# Patient Record
Sex: FEMALE | Race: OTHER | URBAN - METROPOLITAN AREA
[De-identification: names, ages, dates, MRNs, and addresses within clinical notes are randomized per-mention and may not be internally consistent; named-entity substitution may affect disease eponyms.]

---

## 2022-10-10 ENCOUNTER — OFFICE VISIT (OUTPATIENT)
Dept: OBGYN CLINIC | Facility: CLINIC | Age: 47
End: 2022-10-10
Payer: COMMERCIAL

## 2022-10-10 VITALS — SYSTOLIC BLOOD PRESSURE: 135 MMHG | DIASTOLIC BLOOD PRESSURE: 84 MMHG | WEIGHT: 184 LBS

## 2022-10-10 DIAGNOSIS — N92.1 MENOMETRORRHAGIA: Primary | ICD-10-CM

## 2022-10-10 DIAGNOSIS — D50.0 IRON DEFICIENCY ANEMIA DUE TO CHRONIC BLOOD LOSS: ICD-10-CM

## 2022-10-10 DIAGNOSIS — N93.9 ABNORMAL UTERINE BLEEDING (AUB): ICD-10-CM

## 2022-10-10 RX ORDER — LEVONORGESTREL AND ETHINYL ESTRADIOL 0.1-0.02MG
1 KIT ORAL DAILY
Qty: 84 TABLET | Refills: 0 | Status: SHIPPED | OUTPATIENT
Start: 2022-10-10

## 2022-10-10 RX ORDER — LIDOCAINE HYDROCHLORIDE 20 MG/ML
1 SOLUTION ORAL; TOPICAL 2 TIMES DAILY
COMMUNITY
Start: 2022-06-25

## 2022-10-10 RX ORDER — TRANEXAMIC ACID 650 1/1
1300 TABLET ORAL 3 TIMES DAILY
COMMUNITY
Start: 2022-10-08

## 2022-12-21 ENCOUNTER — TELEPHONE (OUTPATIENT)
Dept: OBGYN CLINIC | Facility: CLINIC | Age: 47
End: 2022-12-21

## 2022-12-21 NOTE — TELEPHONE ENCOUNTER
Rep from Dr Ze Alcala office (oncology) calling to request most recent progress notes.  Please fax to [phone]

## 2023-01-12 ENCOUNTER — LAB ENCOUNTER (OUTPATIENT)
Dept: LAB | Age: 48
End: 2023-01-12
Attending: OBSTETRICS & GYNECOLOGY
Payer: COMMERCIAL

## 2023-01-12 ENCOUNTER — TELEPHONE (OUTPATIENT)
Dept: OBGYN CLINIC | Facility: CLINIC | Age: 48
End: 2023-01-12

## 2023-01-12 ENCOUNTER — OFFICE VISIT (OUTPATIENT)
Dept: OBGYN CLINIC | Facility: CLINIC | Age: 48
End: 2023-01-12

## 2023-01-12 VITALS — DIASTOLIC BLOOD PRESSURE: 87 MMHG | WEIGHT: 191 LBS | SYSTOLIC BLOOD PRESSURE: 146 MMHG

## 2023-01-12 DIAGNOSIS — N92.1 MENOMETRORRHAGIA: ICD-10-CM

## 2023-01-12 DIAGNOSIS — N92.6 IRREGULAR MENSES: ICD-10-CM

## 2023-01-12 DIAGNOSIS — D50.9 IRON DEFICIENCY ANEMIA, UNSPECIFIED IRON DEFICIENCY ANEMIA TYPE: Primary | ICD-10-CM

## 2023-01-12 DIAGNOSIS — N93.9 ABNORMAL UTERINE BLEEDING (AUB): ICD-10-CM

## 2023-01-12 LAB — FSH SERPL-ACNC: 11.3 MIU/ML

## 2023-01-12 PROCEDURE — 3077F SYST BP >= 140 MM HG: CPT | Performed by: OBSTETRICS & GYNECOLOGY

## 2023-01-12 PROCEDURE — 3079F DIAST BP 80-89 MM HG: CPT | Performed by: OBSTETRICS & GYNECOLOGY

## 2023-01-12 PROCEDURE — 83001 ASSAY OF GONADOTROPIN (FSH): CPT

## 2023-01-12 PROCEDURE — 36415 COLL VENOUS BLD VENIPUNCTURE: CPT

## 2023-01-12 PROCEDURE — 99214 OFFICE O/P EST MOD 30 MIN: CPT | Performed by: OBSTETRICS & GYNECOLOGY

## 2023-01-12 RX ORDER — NORETHINDRONE ACETATE AND ETHINYL ESTRADIOL 1; .02 MG/1; MG/1
1 TABLET ORAL DAILY
Qty: 84 TABLET | Refills: 0 | Status: SHIPPED | OUTPATIENT
Start: 2023-01-12

## 2023-01-12 NOTE — TELEPHONE ENCOUNTER
I am having trouble finding certain records in [facility] everywhere\".   Please see if you can obtain the office records and operative report from gynecologist Dr. Dary Rodgers who did a D&C on the patient when she had menometrorrhagia and was hospitalized with severe anemia in August.

## 2023-01-13 NOTE — TELEPHONE ENCOUNTER
Unable to see records in care everywhere. Called office of Dr. Jamil Choi at [phone]. [facility] for a return call to obtain records.

## 2023-01-17 NOTE — TELEPHONE ENCOUNTER
Called office of Dr. Nelida Chandler. Spoke to clinical staff and placed on hold for 13 minutes prior to call getting disconnected. Called office again. No answer, LMTCB.

## 2023-01-19 NOTE — TELEPHONE ENCOUNTER
I received a call from Dr. Malena Frias office requesting LISE to be faxed to them at [phone]. Called and spoke to pt. Pt will go to [facility] today to sign LISE. Clinical staff at [facility] informed.      Routing to [employer] as [facility]

## 2023-02-03 ENCOUNTER — MED REC SCAN ONLY (OUTPATIENT)
Dept: OBGYN CLINIC | Facility: CLINIC | Age: 48
End: 2023-02-03

## 2023-02-13 ENCOUNTER — TELEPHONE (OUTPATIENT)
Dept: OBGYN CLINIC | Facility: CLINIC | Age: 48
End: 2023-02-13

## 2023-02-13 NOTE — TELEPHONE ENCOUNTER
Pt bleeding has stopped. There is an order for transvag ultrasound. Pt asking if this gets scheduled with [facility] or Central Scheduling.     Pls advise

## 2023-02-13 NOTE — TELEPHONE ENCOUNTER
Patient name and  verified. Patient informed pelvic US to be completed with radiology. Patient transferred to central scheduling.

## 2023-02-15 ENCOUNTER — HOSPITAL ENCOUNTER (OUTPATIENT)
Dept: ULTRASOUND IMAGING | Age: 48
Discharge: HOME | End: 2023-02-15
Attending: OBSTETRICS & GYNECOLOGY
Payer: COMMERCIAL

## 2023-02-15 DIAGNOSIS — D50.9 IRON DEFICIENCY ANEMIA, UNSPECIFIED IRON DEFICIENCY ANEMIA TYPE: ICD-10-CM

## 2023-02-15 DIAGNOSIS — N93.9 ABNORMAL UTERINE BLEEDING (AUB): ICD-10-CM

## 2023-02-15 PROCEDURE — 76830 TRANSVAGINAL US NON-OB: CPT | Performed by: OBSTETRICS & GYNECOLOGY

## 2023-02-15 PROCEDURE — 76856 US EXAM PELVIC COMPLETE: CPT | Performed by: OBSTETRICS & GYNECOLOGY

## 2023-02-16 ENCOUNTER — OFFICE VISIT (OUTPATIENT)
Dept: OBGYN CLINIC | Facility: CLINIC | Age: 48
End: 2023-02-16

## 2023-02-16 VITALS — WEIGHT: 188.63 LBS | SYSTOLIC BLOOD PRESSURE: 138 MMHG | DIASTOLIC BLOOD PRESSURE: 84 MMHG

## 2023-02-16 DIAGNOSIS — N80.03 ADENOMYOSIS: Primary | ICD-10-CM

## 2023-02-16 DIAGNOSIS — N80.03 ADENOMYOSIS OF UTERUS: ICD-10-CM

## 2023-02-16 DIAGNOSIS — N92.1 MENOMETRORRHAGIA: ICD-10-CM

## 2023-02-16 PROCEDURE — 3079F DIAST BP 80-89 MM HG: CPT | Performed by: OBSTETRICS & GYNECOLOGY

## 2023-02-16 PROCEDURE — 99213 OFFICE O/P EST LOW 20 MIN: CPT | Performed by: OBSTETRICS & GYNECOLOGY

## 2023-02-16 PROCEDURE — 3075F SYST BP GE 130 - 139MM HG: CPT | Performed by: OBSTETRICS & GYNECOLOGY

## 2023-02-23 ENCOUNTER — TELEPHONE (OUTPATIENT)
Dept: OBGYN CLINIC | Facility: CLINIC | Age: 48
End: 2023-02-23

## 2023-02-23 NOTE — TELEPHONE ENCOUNTER
Lmtcb: unable to give pt lupron today as office needs to obtain authorization for injection prior to give it. Pt will be called to receive medication once authorization is obtained.

## 2023-02-24 NOTE — TELEPHONE ENCOUNTER
This RN called pt's plan as Authorization could not be obtained via the [facility]. Called the plan and spoke to intake rep named Leslie. Provided her code for Lupron that is usually obtained for this kind of injection.  and [id number]: no Auth required for [id number]. Was advised to call the following: [employer] program: [phone] to obtain authorization for the J Code. Provided the rep with above information as well as the dx code placed by JAEL: N80.03 and N92.1. Provided rep with office number and fax number. Per rep, this does not need authorization but did not provide an Authorization number. Advised rept that I would need an authorization and she went through with requesting an authorization to make sure. She stated that office will receive notification via Fax.  If clinicals are needed, we would need to fax it back to [phone] with name of member, ID and [facility] number: [id number]

## 2023-02-27 NOTE — TELEPHONE ENCOUNTER
Incoming call received from [place] with [employer]. Clinicals were not received. This RN faxed all clinicals to [phone].

## 2023-02-28 NOTE — TELEPHONE ENCOUNTER
Medication PA required: Lupron 3.75 mg   Quantity: 1  Day Supply: every 28 days  Sig: inject 3.75 mg of Lupron via IM  DX Code: Adenomyosis (n80.03) and Menometrorrhagia (n92.1.)  CPT Code:  and 10175  Case #: [id number]

## 2023-03-02 NOTE — TELEPHONE ENCOUNTER
OB clinical staff, PA you requested 2/24 has been denied-see note below. Please advise on how you wish to proceed. Both written appeal and peer to peer are options. If written appeal-please ask doctor to provide a letter of medical necessity to add to the chart documents. Info for peer to peer is below. Thank you. Medication PA required: Lupron 3.75 mg   Quantity: 1  Day Supply: every 28 days  Sig: inject 3.75 mg of Lupron via IM  DX Code: Adenomyosis (n80.03) and Menometrorrhagia (n92.1.)  CPT Code:  and 62664    [facility]) [phone], spoke with Keirsten Blood., member is handled by other dept, and states to call [phone], since she cannot pull up member ID. Call ref # none     Called [phone], spoke with Hyacinth Rock gave her both pa #s and all codes. She confirmed code 49418 does not require pa but  does require pa. I asked her to cancel the case I submitted just now, and she did. case [id number]. This was withdrawn by [facility]   Call ref # [id number]        She states PA [id number] submitted 2/24/2023 was not approved, did not meet medical criteria. Services are not eligible for coverage because your plan does not cover unproven procedures. Your plan only covers proven procedures, which are:  -Recognized as safe and effective for the diagnosis or treatement of specified condition and   -Based on clinical evidence published in peer-reviewed medical literature. She will refax denial letter to [phone]. She states peer to peer is possible, can call Bebe Green [phone] to speak with [address] [place] [address]. She also states peer to peer can be scheduled at [address] within 21 days . Reference case #PA [id number].

## 2023-03-06 NOTE — TELEPHONE ENCOUNTER
Faxed appeal with Dr Medardo Khan letter of med necessity, copy of denial decision, OB office notes 2/16/2023, 1/12/2023, 10/10/2022, copy of med order, Pelvic US report  2/15/2023, lab 12/30/2022 to [facility] appeal dept at [phone]. Review can take anywhere from 15-30 days. Awaiting determination.

## 2023-03-08 ENCOUNTER — TELEPHONE (OUTPATIENT)
Dept: OBGYN CLINIC | Facility: CLINIC | Age: 48
End: 2023-03-08

## 2023-03-08 NOTE — TELEPHONE ENCOUNTER
Needs the most recent office notes and if Pt did hystro  the records on that too    Fax [phone]

## 2023-03-08 NOTE — TELEPHONE ENCOUNTER
Called [phone], spoke with Qamar Lujan. Inquired if has received faxed appeal documentation, service reference#  [id number]. Ted 62 states patient is handled by another [facility] dept and transferred me to [phone]. Spoke with [employer]. Inquired if has received faxed documentation of appeal. She states she has not received any appeal and the fax # is for [employer] appeal. Informed her that I faxed to the number on the denial letter, fax # [phone]. She states that is for member appeals at [facility]. She states should fax appeal documentation to fax# [phone] and can call [phone] for follow up. Call ref # [id number]  Call time 22min    refaxed appeal to new fax #[phone], as directed by [facility] rep [employer].

## 2023-03-13 NOTE — TELEPHONE ENCOUNTER
[facility], [phone], spoke with Sav Quiñonez, inquired if received faxed appeal information. She states members [facility] commercial policy terminated 58/64/5635. Now has [facility] [place] and so Sav Quiñonez cannot help me. After further discussion she warm transferred me to [facility]. [phone]. Spoke with Rena CHAUHAN/[facility]-\"not [facility]\". She confirmed receipt of faxed appeal documentation sent on both 3/6/2023(case # [id number]) & 3/8/2023 case #[id number]). There are now 2 cases generated. She states they will automatically cancel out the second case faxed 3/8 as a duplicate when reviewed. She states best phone # for checking determination is [phone], in IVR ask for 'something else'.   Call ref # [id number]

## 2023-03-20 NOTE — TELEPHONE ENCOUNTER
[address] [facility],  [phone],  in IVR ask for 'something else',  spoke with Bree Nichols who states patient has [employer] and need to call # on back of card. Call ref # [id number]  [facility] [phone], spoke with Alecia Connelly. Informed appeal submitted on 3/8/2023. He states is still in process, case #[id number]. Turn around time approx 30 calendar days.   Call ref # [id number]

## 2023-03-30 NOTE — TELEPHONE ENCOUNTER
[address] [facility], [phone], spoke with Justice Calhoun appeal submitted on 3/8/2023. She states is duplicate case #[id number]. Original case was submitted on 3/6/2023(case # [id number]) this is denied. The second case will be cancelled out. She states appeal was denied as of March 27th. She will refax letter with appeal denial to [phone], states should be received within 24hours. Call ref # [id number]  Received appeal denial letter. I am routing complete denial details to supervisor Shae Bonner for review.

## 2023-03-31 RX ORDER — NORETHINDRONE ACETATE AND ETHINYL ESTRADIOL 1; .02 MG/1; MG/1
1 TABLET ORAL DAILY
Qty: 84 TABLET | Refills: 0 | Status: SHIPPED | OUTPATIENT
Start: 2023-03-31

## 2023-03-31 NOTE — TELEPHONE ENCOUNTER
Patient name and  verified. Patient informed of AJ[facility] message and recommendations. Patient asking for OCP refill. Refill sent to pharmacy. Has follow up scheduled for 2023.

## 2023-05-23 ENCOUNTER — OFFICE VISIT (OUTPATIENT)
Dept: OBGYN CLINIC | Facility: CLINIC | Age: 48
End: 2023-05-23

## 2023-05-23 VITALS — DIASTOLIC BLOOD PRESSURE: 87 MMHG | WEIGHT: 195.81 LBS | SYSTOLIC BLOOD PRESSURE: 147 MMHG

## 2023-05-23 DIAGNOSIS — N80.03 ADENOMYOSIS: ICD-10-CM

## 2023-05-23 DIAGNOSIS — N93.9 ABNORMAL UTERINE BLEEDING (AUB): Primary | ICD-10-CM

## 2023-05-23 PROCEDURE — 3077F SYST BP >= 140 MM HG: CPT | Performed by: OBSTETRICS & GYNECOLOGY

## 2023-05-23 PROCEDURE — 3079F DIAST BP 80-89 MM HG: CPT | Performed by: OBSTETRICS & GYNECOLOGY

## 2023-05-23 PROCEDURE — 99213 OFFICE O/P EST LOW 20 MIN: CPT | Performed by: OBSTETRICS & GYNECOLOGY

## 2023-08-18 RX ORDER — NORETHINDRONE ACETATE AND ETHINYL ESTRADIOL AND FERROUS FUMARATE 5-7-9-7
1 KIT ORAL DAILY
Qty: 84 TABLET | Refills: 0 | Status: SHIPPED | OUTPATIENT
Start: 2023-08-18

## 2023-09-05 RX ORDER — NORETHINDRONE ACETATE AND ETHINYL ESTRADIOL AND FERROUS FUMARATE 5-7-9-7
1 KIT ORAL DAILY
Qty: 84 TABLET | Refills: 0 | Status: SHIPPED | OUTPATIENT
Start: 2023-09-05

## 2023-12-11 ENCOUNTER — OFFICE VISIT (OUTPATIENT)
Dept: OBGYN CLINIC | Facility: CLINIC | Age: 48
End: 2023-12-11
Payer: COMMERCIAL

## 2023-12-11 VITALS — SYSTOLIC BLOOD PRESSURE: 155 MMHG | DIASTOLIC BLOOD PRESSURE: 83 MMHG | WEIGHT: 195 LBS

## 2023-12-11 DIAGNOSIS — N93.9 ABNORMAL UTERINE BLEEDING (AUB): Primary | ICD-10-CM

## 2023-12-11 PROBLEM — D50.9 IRON DEFICIENCY ANEMIA: Status: RESOLVED | Noted: 2022-10-10 | Resolved: 2023-12-11

## 2023-12-11 PROBLEM — N92.6 IRREGULAR MENSES: Status: RESOLVED | Noted: 2023-01-12 | Resolved: 2023-12-11

## 2023-12-11 PROBLEM — N92.1 MENOMETRORRHAGIA: Status: RESOLVED | Noted: 2022-10-10 | Resolved: 2023-12-11

## 2023-12-11 PROCEDURE — 99212 OFFICE O/P EST SF 10 MIN: CPT | Performed by: OBSTETRICS & GYNECOLOGY

## 2023-12-11 PROCEDURE — 3077F SYST BP >= 140 MM HG: CPT | Performed by: OBSTETRICS & GYNECOLOGY

## 2023-12-11 PROCEDURE — 3079F DIAST BP 80-89 MM HG: CPT | Performed by: OBSTETRICS & GYNECOLOGY

## 2023-12-11 NOTE — PROGRESS NOTES
Subjective:     Patient ID: Flaco Riojas is a 50year old female. HPI  GYN follow-up visit  Patient with history of severe abnormal uterine bleeding from anovulation and adenomyosis has required iron transfusions. Was on low-dose oral contraceptives and stopped on her own 3 months ago. States that her last 3 periods have been normal including  which lasted 4 days. She wishes to try to stay off of the oral contraceptives. Patient is in need of Pap test and screening mammogram.  Patient declines physical exam or Pap test today. Will reschedule. Will order screening mammogram.  Iron deficiency anemia resolved as of her blood test and indices done in March. History/Other:   Review of Systems  Current Outpatient Medications   Medication Sig Dispense Refill    Norethindron-Ethinyl Estrad-Fe 1-20/1-30/1-35 MG-MCG Oral Tab Take 1 tablet by mouth daily. (Patient not taking: Reported on 2023) 84 tablet 0    FEROSUL 325 (65 Fe) MG Oral Tab Take 1 tablet (325 mg total) by mouth 2 (two) times daily. (Patient not taking: Reported on 2023)       Allergies:No Known Allergies    Past Medical History:   Diagnosis Date    Anemia       Past Surgical History:   Procedure Laterality Date          1992          1999          3/2000          2004      Family History   Problem Relation Age of Onset    Hypertension Mother       Social History:   Social History     Socioeconomic History    Marital status: Unknown   Tobacco Use    Smoking status: Never    Smokeless tobacco: Never   Substance and Sexual Activity    Alcohol use: Never    Drug use: Never        Objective:   Physical Exam  def  Assessment & Plan:   History of abnormal uterine bleeding, anovulation, adenomyosis  Stable off meds currently  Return for annual GYN visit and Pap    No orders of the defined types were placed in this encounter.       Meds This Visit:  Requested Prescriptions      No prescriptions requested or ordered in this encounter       Imaging & Referrals:  None

## (undated) NOTE — LETTER
3/6/2023              Vicente Sow        [address] [place] [address]         To Whom It May Concern,    I am writing on behalf of the above named patient to document the need of medical necessity for Lupron 3.75 mg IM injection for the treatment of menometrorrhagia, uterine enlargement and adenomyosis. My patient has tried and failed oral contraceptives (Micronor and Lutera) along with a Dilation and curettage procedure and still continues with persistent abnormal bleeding. She has since been diagnosed with anemia and was required to receive a blood transfusion due to excessive blood loss. Based on the information listed above, I have determined that treatment with Lupron 3.75 mg IM injection is medically necessary for my above named patient.      Sincerely,    Fermin Moore MD  [facility], [address] [place] [address]  [phone]